# Patient Record
Sex: MALE | Race: WHITE | NOT HISPANIC OR LATINO | Employment: PART TIME | URBAN - METROPOLITAN AREA
[De-identification: names, ages, dates, MRNs, and addresses within clinical notes are randomized per-mention and may not be internally consistent; named-entity substitution may affect disease eponyms.]

---

## 2017-11-02 ENCOUNTER — OFFICE VISIT (OUTPATIENT)
Dept: FAMILY MEDICINE | Facility: OTHER | Age: 22
End: 2017-11-02
Attending: NURSE PRACTITIONER
Payer: MEDICAID

## 2017-11-02 VITALS
DIASTOLIC BLOOD PRESSURE: 80 MMHG | SYSTOLIC BLOOD PRESSURE: 100 MMHG | WEIGHT: 285 LBS | OXYGEN SATURATION: 98 % | TEMPERATURE: 98.4 F | HEART RATE: 100 BPM | RESPIRATION RATE: 14 BRPM

## 2017-11-02 DIAGNOSIS — J01.90 ACUTE SINUSITIS WITH SYMPTOMS GREATER THAN 10 DAYS: Primary | ICD-10-CM

## 2017-11-02 PROCEDURE — 99213 OFFICE O/P EST LOW 20 MIN: CPT | Performed by: NURSE PRACTITIONER

## 2017-11-02 PROCEDURE — 99212 OFFICE O/P EST SF 10 MIN: CPT

## 2017-11-02 RX ORDER — CEFDINIR 300 MG/1
600 CAPSULE ORAL DAILY
Qty: 20 CAPSULE | Refills: 0 | Status: SHIPPED | OUTPATIENT
Start: 2017-11-02 | End: 2019-09-26

## 2017-11-02 RX ORDER — FLUTICASONE PROPIONATE 50 MCG
1-2 SPRAY, SUSPENSION (ML) NASAL DAILY
Qty: 16 G | Refills: 0 | Status: SHIPPED | OUTPATIENT
Start: 2017-11-02 | End: 2021-09-30

## 2017-11-02 ASSESSMENT — ANXIETY QUESTIONNAIRES
2. NOT BEING ABLE TO STOP OR CONTROL WORRYING: NOT AT ALL
3. WORRYING TOO MUCH ABOUT DIFFERENT THINGS: SEVERAL DAYS
GAD7 TOTAL SCORE: 2
6. BECOMING EASILY ANNOYED OR IRRITABLE: NOT AT ALL
4. TROUBLE RELAXING: NOT AT ALL
IF YOU CHECKED OFF ANY PROBLEMS ON THIS QUESTIONNAIRE, HOW DIFFICULT HAVE THESE PROBLEMS MADE IT FOR YOU TO DO YOUR WORK, TAKE CARE OF THINGS AT HOME, OR GET ALONG WITH OTHER PEOPLE: NOT DIFFICULT AT ALL
7. FEELING AFRAID AS IF SOMETHING AWFUL MIGHT HAPPEN: NOT AT ALL
5. BEING SO RESTLESS THAT IT IS HARD TO SIT STILL: NOT AT ALL
1. FEELING NERVOUS, ANXIOUS, OR ON EDGE: SEVERAL DAYS

## 2017-11-02 ASSESSMENT — PATIENT HEALTH QUESTIONNAIRE - PHQ9: SUM OF ALL RESPONSES TO PHQ QUESTIONS 1-9: 5

## 2017-11-02 NOTE — MR AVS SNAPSHOT
After Visit Summary   11/2/2017    Waldemar Nicole    MRN: [id number]           Patient Information     Date Of Birth          1995        Visit Information        Provider Department      11/2/2017 3:30 PM Angelia Yost NP [facility]        Today's Diagnoses     Acute sinusitis with symptoms greater than 10 days    -  1      Care Instructions      ASSESSMENT/PLAN:       1. Acute sinusitis with symptoms greater than 10 days  symptomatic  - cefdinir (OMNICEF) 300 MG capsule; Take 2 capsules (600 mg) by mouth daily  Dispense: 20 capsule; Refill: 0  - fluticasone (FLONASE) 50 MCG/ACT spray; Spray 1-2 sprays into both nostrils daily  Dispense: 16 g; Refill: 0  - increase fluids  - rest    FUTURE APPOINTMENTS:       - Follow-up visit if no improvement or any worsening    Angelia Yost NP  [facility]  Sinusitis (Antibiotic Treatment)    The sinuses are air-filled spaces within the bones of the face. They connect to the inside of the nose. Sinusitis is an inflammation of the tissue lining the sinus cavity. Sinus inflammation can occur during a cold. It can also be due to allergies to pollens and other particles in the air. Sinusitis can cause symptoms of sinus congestion and fullness. A sinus infection causes fever, headache and facial pain. There is often green or yellow drainage from the nose or into the back of the throat (post-nasal drip). You have been given antibiotics to treat this condition.  Home care:    Take the full course of antibiotics as instructed. Do not stop taking them, even if you feel better.    Drink plenty of water, hot tea, and other liquids. This may help thin mucus. It also may promote sinus drainage.    Heat may help soothe painful areas of the face. Use a towel soaked in hot water. Or,  the shower and direct the hot spray onto your face. Using a vaporizer along with a menthol rub at night may also  help.     An expectorant containing guaifenesin may help thin the mucus and promote drainage from the sinuses.    Over-the-counter decongestants may be used unless a similar medicine was prescribed. Nasal sprays work the fastest. Use one that contains phenylephrine or oxymetazoline. First blow the nose gently. Then use the spray. Do not use these medicines more often than directed on the label or symptoms may get worse. You may also use tablets containing pseudoephedrine. Avoid products that combine ingredients, because side effects may be increased. Read labels. You can also ask the pharmacist for help. (NOTE: Persons with high blood pressure should not use decongestants. They can raise blood pressure.)    Over-the-counter antihistamines may help if allergies contributed to your sinusitis.      Do not use nasal rinses or irrigation during an acute sinus infection, unless told to by your health care provider. Rinsing may spread the infection to other sinuses.    Use acetaminophen or ibuprofen to control pain, unless another pain medicine was prescribed. (If you have chronic liver or kidney disease or ever had a stomach ulcer, talk with your doctor before using these medicines. Aspirin should never be used in anyone under 18 years of age who is ill with a fever. It may cause severe liver damage.)    Don't smoke. This can worsen symptoms.  Follow-up care  Follow up with your healthcare provider or our staff if you are not improving within the next week.  When to seek medical advice  Call your healthcare provider if any of these occur:    Facial pain or headache becoming more severe    Stiff neck    Unusual drowsiness or confusion    Swelling of the forehead or eyelids    Vision problems, including blurred or double vision    Fever of 100.4 F (38 C) or higher, or as directed by your healthcare provider    Seizure    Breathing problems    Symptoms not resolving within 10 days  Date Last Reviewed: 4/13/2015 2000-2017  "[employer]. [address] [place] [address], [place] [address]. All rights reserved. This information is not intended as a substitute for professional medical care. Always follow your healthcare professional's instructions.                Follow-ups after your visit        Who to contact     If you have questions or need follow up information about today's clinic visit or your schedule please contact [facility] directly at [phone].  Normal or non-critical lab and imaging results will be communicated to you by [facility], letter or phone within 4 business days after the clinic has received the results. If you do not hear from us within 7 days, please contact the clinic through [facility] or phone. If you have a critical or abnormal lab result, we will notify you by phone as soon as possible.  Submit refill requests through [employer] or call your pharmacy and they will forward the refill request to us. Please allow 3 business days for your refill to be completed.          Additional Information About Your Visit        [facility] Information     [employer] lets you send messages to your doctor, view your test results, renew your prescriptions, schedule appointments and more. To sign up, go to [email or web address] . Click on \"Log in\" on the left side of the screen, which will take you to the Welcome page. Then click on \"Sign up Now\" on the right side of the page.     You will be asked to enter the access code listed below, as well as some personal information. Please follow the directions to create your username and password.     Your access code is: [email or web address]  Expires: 2018  3:51 PM     Your access code will  in 90 days. If you need help or a new code, please call your [facility] or [phone].        Care EveryWhere ID     This is your Care EveryWhere ID. This could be used by other organizations to access your [place] medical records  [id number]        Your Vitals Were     Pulse " Temperature Respirations Pulse Oximetry          100 98.4  F (36.9  C) 14 98%         Blood Pressure from Last 3 Encounters:   11/02/17 100/80   09/29/16 118/86    Weight from Last 3 Encounters:   11/02/17 285 lb (129.3 kg)   09/29/16 285 lb (129.3 kg) (>99 %)*     * Growth percentiles are based on [facility] 2-20 Years data.              Today, you had the following     No orders found for display         Today's Medication Changes          These changes are accurate as of: 11/2/17  3:51 PM.  If you have any questions, ask your nurse or doctor.               Start taking these medicines.        Dose/Directions    cefdinir 300 MG capsule   Commonly known as:  OMNICEF   Used for:  Acute sinusitis with symptoms greater than 10 days   Started by:  Angelia Yost NP        Dose:  600 mg   Take 2 capsules (600 mg) by mouth daily   Quantity:  20 capsule   Refills:  0       fluticasone 50 MCG/ACT spray   Commonly known as:  FLONASE   Used for:  Acute sinusitis with symptoms greater than 10 days   Started by:  Angelia Yost NP        Dose:  1-2 spray   Spray 1-2 sprays into both nostrils daily   Quantity:  16 g   Refills:  0         Stop taking these medicines if you haven't already. Please contact your care team if you have questions.     azithromycin 250 MG tablet   Commonly known as:  ZITHROMAX   Stopped by:  Angelia Yost NP                Where to get your medicines      These medications were sent to [place] [address] [place] [address] [address]     Phone:  [phone]     cefdinir 300 MG capsule    fluticasone 50 MCG/ACT spray                Primary Care Provider Office Phone # Fax #    Shannon Franz [phone] [address] [facility] [address]        Equal Access to Services     DARA MUELLER AH: Pati López, ab colvin, stefany enriquez  ariel leggett ah. So [facility] [phone].    ATENCIÓN: Si habla milena, tiene a foley disposición servicios gratuitos de asistencia lingüística. Katie al [phone].    We comply with applicable federal civil rights laws and [place] laws. We do not discriminate on the basis of race, color, national origin, age, disability, sex, sexual orientation, or gender identity.            Thank you!     Thank you for choosing [facility]  for your care. Our goal is always to provide you with excellent care. Hearing back from our patients is one way we can continue to improve our services. Please take a few minutes to complete the written survey that you may receive in the mail after your visit with us. Thank you!             Your Updated Medication List - Protect others around you: Learn how to safely use, store and throw away your medicines at www.disposemymeds.org.          This list is accurate as of: 11/2/17  3:51 PM.  Always use your most recent med list.                   Brand Name Dispense Instructions for use Diagnosis    cefdinir 300 MG capsule    OMNICEF    20 capsule    Take 2 capsules (600 mg) by mouth daily    Acute sinusitis with symptoms greater than 10 days       fluticasone 50 MCG/ACT spray    FLONASE    16 g    Spray 1-2 sprays into both nostrils daily    Acute sinusitis with symptoms greater than 10 days

## 2017-11-02 NOTE — NURSING NOTE
Chief Complaint   Patient presents with     URI       Initial /80 (BP Location: Left arm, Patient Position: Sitting, Cuff Size: Adult Large)  Pulse 100  Temp 98.4  F (36.9  C)  Resp 14  Wt 285 lb (129.3 kg)  SpO2 98% There is no height or weight on file to calculate BMI.  Medication Reconciliation: complete       Gabrielle Eastman

## 2017-11-02 NOTE — PATIENT INSTRUCTIONS
ASSESSMENT/PLAN:       1. Acute sinusitis with symptoms greater than 10 days  symptomatic  - cefdinir (OMNICEF) 300 MG capsule; Take 2 capsules (600 mg) by mouth daily  Dispense: 20 capsule; Refill: 0  - fluticasone (FLONASE) 50 MCG/ACT spray; Spray 1-2 sprays into both nostrils daily  Dispense: 16 g; Refill: 0  - increase fluids  - rest    FUTURE APPOINTMENTS:       - Follow-up visit if no improvement or any worsening    Angelia Yost NP  [facility]  Sinusitis (Antibiotic Treatment)    The sinuses are air-filled spaces within the bones of the face. They connect to the inside of the nose. Sinusitis is an inflammation of the tissue lining the sinus cavity. Sinus inflammation can occur during a cold. It can also be due to allergies to pollens and other particles in the air. Sinusitis can cause symptoms of sinus congestion and fullness. A sinus infection causes fever, headache and facial pain. There is often green or yellow drainage from the nose or into the back of the throat (post-nasal drip). You have been given antibiotics to treat this condition.  Home care:    Take the full course of antibiotics as instructed. Do not stop taking them, even if you feel better.    Drink plenty of water, hot tea, and other liquids. This may help thin mucus. It also may promote sinus drainage.    Heat may help soothe painful areas of the face. Use a towel soaked in hot water. Or,  the shower and direct the hot spray onto your face. Using a vaporizer along with a menthol rub at night may also help.     An expectorant containing guaifenesin may help thin the mucus and promote drainage from the sinuses.    Over-the-counter decongestants may be used unless a similar medicine was prescribed. Nasal sprays work the fastest. Use one that contains phenylephrine or oxymetazoline. First blow the nose gently. Then use the spray. Do not use these medicines more often than directed on the label or symptoms may  get worse. You may also use tablets containing pseudoephedrine. Avoid products that combine ingredients, because side effects may be increased. Read labels. You can also ask the pharmacist for help. (NOTE: Persons with high blood pressure should not use decongestants. They can raise blood pressure.)    Over-the-counter antihistamines may help if allergies contributed to your sinusitis.      Do not use nasal rinses or irrigation during an acute sinus infection, unless told to by your health care provider. Rinsing may spread the infection to other sinuses.    Use acetaminophen or ibuprofen to control pain, unless another pain medicine was prescribed. (If you have chronic liver or kidney disease or ever had a stomach ulcer, talk with your doctor before using these medicines. Aspirin should never be used in anyone under 18 years of age who is ill with a fever. It may cause severe liver damage.)    Don't smoke. This can worsen symptoms.  Follow-up care  Follow up with your healthcare provider or our staff if you are not improving within the next week.  When to seek medical advice  Call your healthcare provider if any of these occur:    Facial pain or headache becoming more severe    Stiff neck    Unusual drowsiness or confusion    Swelling of the forehead or eyelids    Vision problems, including blurred or double vision    Fever of 100.4 F (38 C) or higher, or as directed by your healthcare provider    Seizure    Breathing problems    Symptoms not resolving within 10 days  Date Last Reviewed: 4/13/2015 2000-2017 [employer]. [address] [place] [address]. All rights reserved. This information is not intended as a substitute for professional medical care. Always follow your healthcare professional's instructions.

## 2017-11-02 NOTE — PROGRESS NOTES
SUBJECTIVE:   Waldemar Nicole is a 22 year old male who presents to clinic today for the following health issues:      Acute Illness   Acute illness concerns: cough  Onset: 2 weeks    Fever: no     Chills/Sweats: no     Headache (location?): no     Sinus Pressure:no    Conjunctivitis:  no    Ear Pain: no    Rhinorrhea: YES    Congestion: YES, chest    Sore Throat:YES     Cough: YES - productive    Wheeze: no     Decreased Appetite: YES    Nausea: no     Vomiting: no     Diarrhea:  no     Dysuria/Freq.: no     Fatigue/Achiness: no     Sick/Strep Exposure: no      Therapies Tried and outcome: dayquil, nyquil, cough drops, slight help            Problem list and histories reviewed & adjusted, as indicated.  Additional history: as documented    There is no problem list on file for this patient.    Past Surgical History:   Procedure Laterality Date     CIRCUMCISION         Social History   Substance Use Topics     Smoking status: Never Smoker     Smokeless tobacco: Never Used      Comment: no passive exposure     Alcohol use Not on file     Family History   Problem Relation Age of Onset     Allergies Father          No current outpatient prescriptions on file.     Allergies   Allergen Reactions     Amoxicillin      No lab results found.   BP Readings from Last 3 Encounters:   11/02/17 100/80   09/29/16 118/86    Wt Readings from Last 3 Encounters:   11/02/17 285 lb (129.3 kg)   09/29/16 285 lb (129.3 kg) (>99 %)*     * Growth percentiles are based on CDC 2-20 Years data.            Reviewed and updated as needed this visit by clinical staff     Reviewed and updated as needed this visit by Provider         ROS:  Constitutional, HEENT, cardiovascular, pulmonary, gi and gu systems are negative, except as otherwise noted.      OBJECTIVE:   /80 (BP Location: Left arm, Patient Position: Sitting, Cuff Size: Adult Large)  Pulse 100  Temp 98.4  F (36.9  C)  Resp 14  Wt 285 lb (129.3 kg)  SpO2 98%  There is no height or  weight on file to calculate BMI.  GENERAL: healthy, alert and no distress  HENT: normal cephalic/atraumatic, right ear: dull, left ear: erythematous and bulging membrane, nose and mouth without ulcers or lesions, nasal mucosa edematous , rhinorrhea thick and purulent, oral mucous membranes moist and sinuses: maxillary tenderness on bilateral, maxillary swelling on bilateral, throat erythematous with post nasal drip noted.   NECK: cervical adenopathy anterior, no asymmetry, masses, or scars and thyroid normal to palpation  RESP: lungs clear to auscultation - no rales, rhonchi or wheezes  CV: regular rate and rhythm, normal S1 S2, no S3 or S4, no murmur, click or rub, no peripheral edema and peripheral pulses strong  MS: no gross musculoskeletal defects noted, no edema  PSYCH: mentation appears normal, affect normal/bright      ASSESSMENT/PLAN:       1. Acute sinusitis with symptoms greater than 10 days  symptomatic  - cefdinir (OMNICEF) 300 MG capsule; Take 2 capsules (600 mg) by mouth daily  Dispense: 20 capsule; Refill: 0  - fluticasone (FLONASE) 50 MCG/ACT spray; Spray 1-2 sprays into both nostrils daily  Dispense: 16 g; Refill: 0  - increase fluids  - rest    FUTURE APPOINTMENTS:       - Follow-up visit if no improvement or any worsening    Angelia Yost NP  [facility]

## 2017-11-03 ASSESSMENT — ANXIETY QUESTIONNAIRES: GAD7 TOTAL SCORE: 2

## 2019-09-25 ENCOUNTER — NURSE TRIAGE (OUTPATIENT)
Dept: FAMILY MEDICINE | Facility: OTHER | Age: 24
End: 2019-09-25

## 2019-09-25 NOTE — PROGRESS NOTES
Subjective     Waldemar Nicole is a 24 year old male who presents to clinic today for the following health issues:    HPI   Acute Illness   Acute illness concerns: URI  Onset: 7 days    Fever: YES    Chills/Sweats: YES    Headache (location?): YES    Sinus Pressure:YES    Conjunctivitis:  no    Ear Pain: YES: bilateral    Rhinorrhea: no     Congestion: YES    Sore Throat: no     Cough: YES-productive of yellow sputum, barking, worsening over time    Wheeze: no    Decreased Appetite: no    Nausea: YES    Vomiting: YES    Diarrhea:  no    Dysuria/Freq.: no    Fatigue/Achiness: YES    Sick/Strep Exposure: no      Therapies Tried and outcome: robitussin- not relief        There is no problem list on file for this patient.    Past Surgical History:   Procedure Laterality Date     CIRCUMCISION         Social History     Tobacco Use     Smoking status: Never Smoker     Smokeless tobacco: Never Used     Tobacco comment: no passive exposure   Substance Use Topics     Alcohol use: Not on file     Family History   Problem Relation Age of Onset     Allergies Father          Current Outpatient Medications   Medication Sig Dispense Refill     cefdinir (OMNICEF) 300 MG capsule Take 1 capsule (300 mg) by mouth 2 times daily for 10 days 20 capsule 0     fluticasone (FLONASE) 50 MCG/ACT spray Spray 1-2 sprays into both nostrils daily (Patient not taking: Reported on 9/26/2019) 16 g 0     Allergies   Allergen Reactions     Amoxicillin          Reviewed and updated as needed this visit by Provider         Review of Systems   ROS COMP: Constitutional, HEENT, cardiovascular, pulmonary, gi and gu systems are negative, except as otherwise noted.      Objective    /74 (BP Location: Right arm, Patient Position: Sitting, Cuff Size: Adult Regular)   Pulse 120   Temp 98.3  F (36.8  C) (Tympanic)   Wt 128.2 kg (282 lb 9.6 oz)   SpO2 90%   There is no height or weight on file to calculate BMI.  Physical Exam   GENERAL: alert and no  distress  EYES: Eyes grossly normal to inspection, PERRL and conjunctivae and sclerae normal  HENT: normal cephalic/atraumatic, both ears: clear effusion, nasal mucosa edematous , rhinorrhea clear, oropharynx clear and oral mucous membranes moist  NECK: no adenopathy  RESP: lungs clear to auscultation - no rales, rhonchi or wheezes  CV: regular rates and rhythm, normal S1 S2, no S3 or S4 and no murmur, click or rub  PSYCH: mentation appears normal, affect normal/bright    Diagnostic Test Results:  none         Assessment & Plan     1. Acute sinusitis with symptoms greater than 10 days  Antibiotics prescribed.  Symptomatic cares recommended.  Follow-up if no improvement noted.  - cefdinir (OMNICEF) 300 MG capsule; Take 1 capsule (300 mg) by mouth 2 times daily for 10 days  Dispense: 20 capsule; Refill: 0         Return if symptoms worsen or fail to improve.    Shannon Franz MD  [facility]

## 2019-09-25 NOTE — TELEPHONE ENCOUNTER
Patient unable to come in for appointment until  Tomorrow due to ride.  Usually see's Flaim but she is off tomorrow.  Scheduled with [facility] at 10.  Advised to go to ER/UC if symptoms worsen until the  Additional Information    Negative: Bluish (or gray) lips or face    Negative: Severe difficulty breathing (e.g., struggling for each breath, speaks in single words)    Negative: Rapid onset of cough and has hives    Negative: Coughing started suddenly after medicine, an allergic food or bee sting    Negative: Difficulty breathing after exposure to flames, smoke, or fumes    Negative: Sounds like a life-threatening emergency to the triager    Negative: Previous asthma attacks and this feels like asthma attack    Negative: Chest pain present when not coughing    Negative: Difficulty breathing    Negative: Passed out (i.e., fainted, collapsed and was not responding)    Negative: Patient sounds very sick or weak to the triager    Negative: Coughed up > 1 tablespoon (15 ml) blood (Exception: blood-tinged sputum)    Negative: Fever > 103 F (39.4 C)    Negative: Fever > 101 F (38.3 C) and over 60 years of age    Negative: Fever > 100.0 F (37.8 C) and has diabetes mellitus or a weak immune system (e.g., HIV positive, cancer chemotherapy, organ transplant, splenectomy, chronic steroids)    Negative: Fever > 100.0 F (37.8 C) and bedridden (e.g., nursing home patient, stroke, chronic illness, recovering from surgery)    Negative: Increasing ankle swelling    Negative: Wheezing is present    Continuous (nonstop) coughing interferes with work or school and no improvement using cough treatment per Care Advice    Negative: SEVERE coughing spells (e.g., whooping sound after coughing, vomiting after coughing)    Negative: Coughing up leonie-colored (reddish-brown) or blood-tinged sputum    Negative: Fever present > 3 days (72 hours)    Negative: Fever returns after gone for over 24 hours and symptoms worse or not improved     "Negative: Using nasal washes and pain medicine > 24 hours and sinus pain persists    Negative: Known COPD or other severe lung disease (i.e., bronchiectasis, cystic fibrosis, lung surgery) and worsening symptoms (i.e., increased sputum purulence or amount, increased breathing difficulty)    Answer Assessment - Initial Assessment Questions  1. ONSET: \"When did the cough begin?\"       One week ago  2. SEVERITY: \"How bad is the cough today?\"       Barking cough, vomiting  3. RESPIRATORY DISTRESS: \"Describe your breathing.\"       Nasal congestion  4. FEVER: \"Do you have a fever?\" If so, ask: \"What is your temperature, how was it measured, and when did it start?\"      99.1  5. HEMOPTYSIS: \"Are you coughing up any blood?\" If so ask: \"How much?\" (flecks, streaks, tablespoons, etc.)      mucas from nose is yellow   6. TREATMENT: \"What have you done so far to treat the cough?\" (e.g., meds, fluids, humidifier)      robitussin  7. CARDIAC HISTORY: \"Do you have any history of heart disease?\" (e.g., heart attack, congestive heart failure)         8. LUNG HISTORY: \"Do you have any history of lung disease?\"  (e.g., pulmonary embolus, asthma, emphysema)        9. PE RISK FACTORS: \"Do you have a history of blood clots?\" (or: recent major surgery, recent prolonged travel, bedridden )        10. OTHER SYMPTOMS: \"Do you have any other symptoms? (e.g., runny nose, wheezing, chest pain)        Runny nose, coughing up phlegm  11. PREGNANCY: \"Is there any chance you are pregnant?\" \"When was your last menstrual period?\"          12. TRAVEL: \"Have you traveled out of the country in the last month?\" (e.g., travel history, exposures)    Protocols used: COUGH-A-OH      "

## 2019-09-26 ENCOUNTER — OFFICE VISIT (OUTPATIENT)
Dept: FAMILY MEDICINE | Facility: OTHER | Age: 24
End: 2019-09-26
Attending: FAMILY MEDICINE
Payer: MEDICAID

## 2019-09-26 VITALS
SYSTOLIC BLOOD PRESSURE: 124 MMHG | WEIGHT: 282.6 LBS | TEMPERATURE: 98.3 F | HEART RATE: 120 BPM | DIASTOLIC BLOOD PRESSURE: 74 MMHG | OXYGEN SATURATION: 90 %

## 2019-09-26 DIAGNOSIS — J01.90 ACUTE SINUSITIS WITH SYMPTOMS GREATER THAN 10 DAYS: Primary | ICD-10-CM

## 2019-09-26 PROCEDURE — 99213 OFFICE O/P EST LOW 20 MIN: CPT | Performed by: FAMILY MEDICINE

## 2019-09-26 PROCEDURE — G0463 HOSPITAL OUTPT CLINIC VISIT: HCPCS

## 2019-09-26 RX ORDER — CEFDINIR 300 MG/1
300 CAPSULE ORAL 2 TIMES DAILY
Qty: 20 CAPSULE | Refills: 0 | Status: SHIPPED | OUTPATIENT
Start: 2019-09-26 | End: 2019-10-06

## 2019-09-26 ASSESSMENT — PAIN SCALES - GENERAL: PAINLEVEL: NO PAIN (0)

## 2019-09-26 NOTE — NURSING NOTE
Chief Complaint   Patient presents with     URI       Initial /74 (BP Location: Right arm, Patient Position: Sitting, Cuff Size: Adult Regular)   Pulse 120   Temp 98.3  F (36.8  C) (Tympanic)   Wt 128.2 kg (282 lb 9.6 oz)   SpO2 90%  There is no height or weight on file to calculate BMI.  Medication Reconciliation: complete  Henna Friedman MA

## 2019-09-26 NOTE — LETTER
[facility]  [address]  [place] [address]  Phone: [phone]    September 26, 2019        Waldemar Nicole  [facility] [address] [place] [address]          To whom it may concern:    RE: Waldemar Nicole    Patient was seen and treated today at our clinic.  He may return to work once he is fever-free for 24 hours.    Please contact me for questions or concerns.      Sincerely,        Shannon Franz MD

## 2020-10-12 ENCOUNTER — TRANSFERRED RECORDS (OUTPATIENT)
Dept: HEALTH INFORMATION MANAGEMENT | Facility: CLINIC | Age: 25
End: 2020-10-12

## 2021-06-23 ENCOUNTER — APPOINTMENT (OUTPATIENT)
Dept: OCCUPATIONAL MEDICINE | Facility: OTHER | Age: 26
End: 2021-06-23

## 2021-06-23 PROCEDURE — 99000 SPECIMEN HANDLING OFFICE-LAB: CPT

## 2021-09-29 ENCOUNTER — NURSE TRIAGE (OUTPATIENT)
Dept: FAMILY MEDICINE | Facility: OTHER | Age: 26
End: 2021-09-29

## 2021-09-29 NOTE — TELEPHONE ENCOUNTER
"Patient poyl with ear congestion. Patient states he has been dealing with symptoms for 2 weeks. Patient scheduled to see provider tomorrow.     Reason for Disposition    Ear congestion present > 48 hours    Additional Information    Negative: Ear pain is the main symptom    Negative: Hearing loss (complete or partial) is the main symptom    Negative: Earwax is the main concern    Negative: Has nasal allergies and they are acting up    Negative: Earache lasts > 1 hour    Negative: Pus or cloudy discharge from ear canal    Negative: Patient wants to be seen    Answer Assessment - Initial Assessment Questions  1. LOCATION: \"Which ear is involved?\"        Left ear  2. SENSATION: \"Describe how the ear feels.\"       Feels plugged  3. ONSET:  \"When did the ear symptoms start?\"        Two weeks ago  4. PAIN: \"Do you also have an earache?\" If so, ask: \"How bad is it?\" (Scale 1-10; or mild, moderate, severe)      no  5. CAUSE: \"What do you think is causing the ear congestion?\"      Possible allergies  6. URI: \"Do you have a runny nose or cough?\"       no  7. NASAL ALLERGIES: \"Are there symptoms of hay fever, such as sneezing or a clear nasal discharge?\"      no  8. PREGNANCY: \"Is there any chance you are pregnant?\" \"When was your last menstrual period?\"      n/a    Protocols used: EAR - CONGESTION-A-OH      "

## 2021-09-30 ENCOUNTER — OFFICE VISIT (OUTPATIENT)
Dept: FAMILY MEDICINE | Facility: OTHER | Age: 26
End: 2021-09-30
Attending: NURSE PRACTITIONER
Payer: MEDICAID

## 2021-09-30 VITALS
SYSTOLIC BLOOD PRESSURE: 124 MMHG | HEIGHT: 72 IN | HEART RATE: 104 BPM | OXYGEN SATURATION: 97 % | BODY MASS INDEX: 42.66 KG/M2 | DIASTOLIC BLOOD PRESSURE: 82 MMHG | WEIGHT: 315 LBS | TEMPERATURE: 98.9 F

## 2021-09-30 DIAGNOSIS — H69.92 DYSFUNCTION OF LEFT EUSTACHIAN TUBE: Primary | ICD-10-CM

## 2021-09-30 DIAGNOSIS — J31.0 CHRONIC RHINITIS: ICD-10-CM

## 2021-09-30 DIAGNOSIS — M54.50 BILATERAL LOW BACK PAIN WITHOUT SCIATICA, UNSPECIFIED CHRONICITY: ICD-10-CM

## 2021-09-30 PROCEDURE — 99213 OFFICE O/P EST LOW 20 MIN: CPT | Performed by: NURSE PRACTITIONER

## 2021-09-30 PROCEDURE — G0463 HOSPITAL OUTPT CLINIC VISIT: HCPCS

## 2021-09-30 RX ORDER — FLUTICASONE PROPIONATE 50 MCG
1-2 SPRAY, SUSPENSION (ML) NASAL DAILY
Qty: 16 G | Refills: 0 | Status: SHIPPED | OUTPATIENT
Start: 2021-09-30

## 2021-09-30 RX ORDER — CIPROFLOXACIN AND DEXAMETHASONE 3; 1 MG/ML; MG/ML
4 SUSPENSION/ DROPS AURICULAR (OTIC) 2 TIMES DAILY
Qty: 2.8 ML | Refills: 0 | Status: SHIPPED | OUTPATIENT
Start: 2021-09-30 | End: 2021-10-07

## 2021-09-30 ASSESSMENT — MIFFLIN-ST. JEOR: SCORE: 2465.9

## 2021-09-30 ASSESSMENT — PAIN SCALES - GENERAL: PAINLEVEL: NO PAIN (0)

## 2021-09-30 NOTE — NURSING NOTE
Chief Complaint   Patient presents with     Ear Fullness       Initial /82 (BP Location: Right arm, Patient Position: Chair, Cuff Size: Adult Large)   Pulse 104   Temp 98.9  F (37.2  C) (Tympanic)   Ht 1.829 m (6')   Wt 143.8 kg (317 lb)   SpO2 97%   BMI 42.99 kg/m   Estimated body mass index is 42.99 kg/m  as calculated from the following:    Height as of this encounter: 1.829 m (6').    Weight as of this encounter: 143.8 kg (317 lb).  Medication Reconciliation: complete  Celsa Eugene LPN

## 2021-09-30 NOTE — PROGRESS NOTES
Assessment & Plan     1. Dysfunction of left eustachian tube  Continue flonase and zyrtec  - ciprofloxacin-dexamethasone (CIPRODEX) 0.3-0.1 % otic suspension; Place 4 drops Into the left ear 2 times daily for 7 days  Dispense: 2.8 mL; Refill: 0    2. Chronic rhinitis  - fluticasone (FLONASE) 50 MCG/ACT nasal spray; Spray 1-2 sprays into both nostrils daily  Dispense: 16 g; Refill: 0    3. Bilateral low back pain without sciatica, unspecified chronicity  Work note provided         BMI:   Estimated body mass index is 42.99 kg/m  as calculated from the following:    Height as of this encounter: 1.829 m (6').    Weight as of this encounter: 143.8 kg (317 lb).   Weight management plan: Discussed healthy diet and exercise guidelines    Follow-up as needed or if no improvement or any worsening.     Angelia Yost NP  [facility] - [place] LUIS Medeiros is a 26 year old who presents for the following health issues     HPI     Acute Illness  Acute illness concerns: ear infection   Onset/Duration: about 2 weeks.   Symptoms:  Fever: no  Chills/Sweats: no  Headache (location?): no  Sinus Pressure: no  Conjunctivitis:  no  Ear Pain: YES: left- plugged no pain   Rhinorrhea: no  Congestion: no  Sore Throat: no  Cough: no  Wheeze: no  Decreased Appetite: no  Nausea: no  Vomiting: no  Diarrhea: no  Dysuria/Freq.: no  Dysuria or Hematuria: no  Fatigue/Achiness: no  Sick/Strep Exposure: no  Therapies tried and outcome: zyrtec and flonase.     Back Pain- needs work note   Onset/Duration: 9/21/21  Description:   Location of pain: middle of back left  Character of pain: dull ache  Pain radiation: none  New numbness or weakness in legs, not attributed to pain: no   Intensity: At its worst 0/10  Progression of Symptoms: improving  History:   Specific cause: none  Pain interferes with job:  no   History of back problems: no prior back problems  Any previous MRI or X-rays: None  Sees a specialist for back  pain: No  Alleviating factors:   Improved by: rest    Precipitating factors:  Worsened by: Nothing  Therapies tried and outcome: rest    He just needs a work note to return to work next week.  He feels he can do his job safely.      There is no problem list on file for this patient.    Past Surgical History:   Procedure Laterality Date     CIRCUMCISION         Social History     Tobacco Use     Smoking status: Never Smoker     Smokeless tobacco: Never Used     Tobacco comment: no passive exposure   Substance Use Topics     Alcohol use: Not on file     Family History   Problem Relation Age of Onset     Allergies Father          Current Outpatient Medications   Medication Sig Dispense Refill     fluticasone (FLONASE) 50 MCG/ACT spray Spray 1-2 sprays into both nostrils daily (Patient not taking: Reported on 9/26/2019) 16 g 0     Allergies   Allergen Reactions     Amoxicillin      No lab results found.   BP Readings from Last 3 Encounters:   09/30/21 124/82   09/26/19 124/74   11/02/17 100/80    Wt Readings from Last 3 Encounters:   09/30/21 143.8 kg (317 lb)   09/26/19 128.2 kg (282 lb 9.6 oz)   11/02/17 129.3 kg (285 lb)                      Review of Systems   Constitutional, HEENT, cardiovascular, pulmonary, gi and gu systems are negative, except as otherwise noted.      Objective    /82 (BP Location: Right arm, Patient Position: Chair, Cuff Size: Adult Large)   Pulse 104   Temp 98.9  F (37.2  C) (Tympanic)   Ht 1.829 m (6')   Wt 143.8 kg (317 lb)   SpO2 97%   BMI 42.99 kg/m    Body mass index is 42.99 kg/m .  Physical Exam   GENERAL: alert, no distress and obese  HENT: normal cephalic/atraumatic, right ear: normal: no effusions, no erythema, normal landmarks, left ear: erythematous and retracted, nose and mouth without ulcers or lesions, oropharynx clear and oral mucous membranes moist  NECK: no adenopathy, no asymmetry, masses, or scars and thyroid normal to palpation  RESP: lungs clear to  auscultation - no rales, rhonchi or wheezes  CV: regular rate and rhythm, normal S1 S2, no S3 or S4, no murmur, click or rub, no peripheral edema and peripheral pulses strong  MS: no gross musculoskeletal defects noted, no edema  PSYCH: mentation appears normal, affect normal/bright

## 2021-09-30 NOTE — LETTER
[facility]  [address]  [facility] [address]  Phone: [phone]    September 30, 2021        Waldemar Nicole  [facility] [place] [id number]          To whom it may concern:    RE: Waldemar Nicole    Patient was seen and treated today at our clinic.  Patient may return to work October 5, 2021 with the following:  No working or lifting restrictions    Please contact me for questions or concerns.      Sincerely,        Angelia Yost NP

## 2021-09-30 NOTE — PATIENT INSTRUCTIONS
Assessment & Plan     1. Dysfunction of left eustachian tube  Continue flonase and zyrtec  - ciprofloxacin-dexamethasone (CIPRODEX) 0.3-0.1 % otic suspension; Place 4 drops Into the left ear 2 times daily for 7 days  Dispense: 2.8 mL; Refill: 0    2. Chronic rhinitis  - fluticasone (FLONASE) 50 MCG/ACT nasal spray; Spray 1-2 sprays into both nostrils daily  Dispense: 16 g; Refill: 0    3. Bilateral low back pain without sciatica, unspecified chronicity  Work note provided         BMI:   Estimated body mass index is 42.99 kg/m  as calculated from the following:    Height as of this encounter: 1.829 m (6').    Weight as of this encounter: 143.8 kg (317 lb).   Weight management plan: Discussed healthy diet and exercise guidelines    Follow-up as needed or fi no improvement or any worsening.     Angelia Yost NP  [facility]

## 2021-10-03 ENCOUNTER — HEALTH MAINTENANCE LETTER (OUTPATIENT)
Age: 26
End: 2021-10-03

## 2022-01-03 ENCOUNTER — OFFICE VISIT (OUTPATIENT)
Dept: FAMILY MEDICINE | Facility: OTHER | Age: 27
End: 2022-01-03
Attending: NURSE PRACTITIONER
Payer: MEDICAID

## 2022-01-03 VITALS
BODY MASS INDEX: 42.66 KG/M2 | HEART RATE: 131 BPM | TEMPERATURE: 98.2 F | DIASTOLIC BLOOD PRESSURE: 80 MMHG | SYSTOLIC BLOOD PRESSURE: 128 MMHG | WEIGHT: 315 LBS | OXYGEN SATURATION: 98 % | HEIGHT: 72 IN

## 2022-01-03 DIAGNOSIS — Z01.10 NORMAL EAR EXAM: Primary | ICD-10-CM

## 2022-01-03 PROCEDURE — 99212 OFFICE O/P EST SF 10 MIN: CPT | Performed by: NURSE PRACTITIONER

## 2022-01-03 PROCEDURE — G0463 HOSPITAL OUTPT CLINIC VISIT: HCPCS

## 2022-01-03 ASSESSMENT — MIFFLIN-ST. JEOR: SCORE: 2465.9

## 2022-01-03 ASSESSMENT — PAIN SCALES - GENERAL: PAINLEVEL: NO PAIN (0)

## 2022-01-03 NOTE — NURSING NOTE
Chief Complaint   Patient presents with     Foreign Body in Ear       Initial /80 (BP Location: Left arm, Patient Position: Chair, Cuff Size: Adult Large)   Pulse (!) 131   Temp 98.2  F (36.8  C)   Ht 1.829 m (6')   Wt 143.8 kg (317 lb)   SpO2 98%   BMI 42.99 kg/m   Estimated body mass index is 42.99 kg/m  as calculated from the following:    Height as of this encounter: 1.829 m (6').    Weight as of this encounter: 143.8 kg (317 lb).  Medication Reconciliation: complete  Celsa Eugene LPN

## 2022-01-03 NOTE — PROGRESS NOTES
Assessment & Plan       Normal ear exam  - I see no visible abnormality  - Follow-up as needed      Martha Byrne, LAVERNE  [facility] - [place]RISTOPHER Medeiros is a 26 year old who presents for the following health issues         Concern - Ear problem  Onset: 1/1/21  Description: pt states using home otoscope noted something metallic in ear canal   Intensity: mild  Progression of Symptoms:  same  Accompanying Signs & Symptoms: none  Previous history of similar problem: none- hx ETD   Precipitating factors:        Worsened by: none  Alleviating factors:        Improved by: none  Therapies tried and outcome:  none           There is no problem list on file for this patient.    Past Surgical History:   Procedure Laterality Date     CIRCUMCISION         Social History     Tobacco Use     Smoking status: Never Smoker     Smokeless tobacco: Never Used     Tobacco comment: no passive exposure   Substance Use Topics     Alcohol use: Not on file     Family History   Problem Relation Age of Onset     Allergies Father            Current Outpatient Medications   Medication Sig Dispense Refill     fluticasone (FLONASE) 50 MCG/ACT nasal spray Spray 1-2 sprays into both nostrils daily 16 g 0       Allergies   Allergen Reactions     Amoxicillin        No lab results found.         BP Readings from Last 3 Encounters:   01/03/22 128/80   09/30/21 124/82   09/26/19 124/74    Wt Readings from Last 3 Encounters:   01/03/22 143.8 kg (317 lb)   09/30/21 143.8 kg (317 lb)   09/26/19 128.2 kg (282 lb 9.6 oz)                 Review of Systems   Constitutional, HEENT, cardiovascular, pulmonary, gi and gu systems are negative, except as otherwise noted.        Objective    /80 (BP Location: Left arm, Patient Position: Chair, Cuff Size: Adult Large)   Pulse (!) 131   Temp 98.2  F (36.8  C)   Ht 1.829 m (6')   Wt 143.8 kg (317 lb)   SpO2 98%   BMI 42.99 kg/m    Body mass index is 42.99 kg/m .       Physical Exam   GENERAL:  healthy, alert and no distress  HENT: Bilateral TMs normal in appearance.  Left ear canal -  Small flake of wax noted, no foreign body visible.   NECK: no adenopathy, no asymmetry, masses, or scars and thyroid normal to palpation  RESP: lungs clear to auscultation - no rales, rhonchi or wheezes  CV: regular rate and rhythm, normal S1 S2, no S3 or S4, no murmur, click or rub

## 2022-01-03 NOTE — PATIENT INSTRUCTIONS
Assessment & Plan       Normal ear exam  - I see no visible abnormality  - Follow-up as needed          Martha Byrne, CNP  [facility] - [place] [facility]

## 2022-01-30 NOTE — PROGRESS NOTES
Assessment & Plan        Rule out 2019 novel coronavirus infection  - Symptomatic; Unknown Influenza A/B & SARS-CoV2 (COVID-19) Virus PCR Multiplex Nose      Insure adequate fluid intake  Get plenty of rest  Monitor for temp at home, treat with OTC Tylenol or Ibuprofen per package instruction.  Humidity at home (add bacteriostatic solution to humidifier)  Please return in you do not improve  To UC or ER with persistent, worsening, or concerning symptoms      Martha Byrne CNP  [facility] - CHRISTOPHER Medeiros is a 26 year old who presents for the following health issues       Acute Illness  Acute illness concerns: cough/fever  Onset/Duration: 1 week  Symptoms:  Fever: YES  Chills/Sweats: no  Headache (location?): no  Sinus Pressure: no  Conjunctivitis:  no  Ear Pain: no  Rhinorrhea: no  Congestion: YES  Sore Throat: no  Cough: YES-non-productive  Wheeze: no  Decreased Appetite: no  Nausea: no  Vomiting: no  Diarrhea: no  Dysuria/Freq.: no  Dysuria or Hematuria: no  Fatigue/Achiness: no  Sick/Strep Exposure: no  Therapies tried and outcome: cough medicine      He had 2 negative home tests          There is no problem list on file for this patient.    Past Surgical History:   Procedure Laterality Date     CIRCUMCISION         Social History     Tobacco Use     Smoking status: Never Smoker     Smokeless tobacco: Never Used     Tobacco comment: no passive exposure   Substance Use Topics     Alcohol use: Not on file     Family History   Problem Relation Age of Onset     Allergies Father              Current Outpatient Medications   Medication Sig Dispense Refill     fluticasone (FLONASE) 50 MCG/ACT nasal spray Spray 1-2 sprays into both nostrils daily 16 g 0         Allergies   Allergen Reactions     Amoxicillin        No lab results found.         BP Readings from Last 3 Encounters:   01/31/22 104/86   01/03/22 128/80   09/30/21 124/82    Wt Readings from Last 3 Encounters:   01/31/22 140.6 kg (310 lb)    01/03/22 143.8 kg (317 lb)   09/30/21 143.8 kg (317 lb)               Review of Systems   Constitutional, HEENT, cardiovascular, pulmonary, gi and gu systems are negative, except as otherwise noted.        Objective    /86 (BP Location: Right arm, Patient Position: Sitting, Cuff Size: Adult Large)   Pulse 101   Temp 97.4  F (36.3  C) (Tympanic)   Resp 18   Wt 140.6 kg (310 lb)   SpO2 97%   BMI 42.04 kg/m    Body mass index is 42.04 kg/m .       Physical Exam   GENERAL: healthy, alert and no distress  EYES: Eyes grossly normal to inspection, PERRL and conjunctivae and sclerae normal  HENT: ear canals and TM's normal, nose and mouth without ulcers or lesions  NECK: no adenopathy, no asymmetry, masses, or scars and thyroid normal to palpation  RESP: lungs clear to auscultation - no rales, rhonchi or wheezes  CV: regular rate and rhythm, normal S1 S2, no S3 or S4, no murmur, click or rub, no peripheral edema and peripheral pulses strong  SKIN: no suspicious lesions or rashes  PSYCH: mentation appears normal, affect normal/bright

## 2022-01-31 ENCOUNTER — OFFICE VISIT (OUTPATIENT)
Dept: FAMILY MEDICINE | Facility: OTHER | Age: 27
End: 2022-01-31
Attending: NURSE PRACTITIONER
Payer: MEDICAID

## 2022-01-31 VITALS
TEMPERATURE: 97.4 F | RESPIRATION RATE: 18 BRPM | SYSTOLIC BLOOD PRESSURE: 104 MMHG | BODY MASS INDEX: 42.04 KG/M2 | DIASTOLIC BLOOD PRESSURE: 86 MMHG | OXYGEN SATURATION: 97 % | HEART RATE: 101 BPM | WEIGHT: 310 LBS

## 2022-01-31 DIAGNOSIS — Z20.822 SUSPECTED 2019 NOVEL CORONAVIRUS INFECTION: Primary | ICD-10-CM

## 2022-01-31 PROCEDURE — G0463 HOSPITAL OUTPT CLINIC VISIT: HCPCS

## 2022-01-31 PROCEDURE — 87637 SARSCOV2&INF A&B&RSV AMP PRB: CPT | Mod: ZL | Performed by: NURSE PRACTITIONER

## 2022-01-31 PROCEDURE — 99213 OFFICE O/P EST LOW 20 MIN: CPT | Performed by: NURSE PRACTITIONER

## 2022-01-31 ASSESSMENT — PAIN SCALES - GENERAL: PAINLEVEL: NO PAIN (0)

## 2022-01-31 NOTE — PATIENT INSTRUCTIONS
Assessment & Plan        Rule out 2019 novel coronavirus infection  - Symptomatic; Unknown Influenza A/B & SARS-CoV2 (COVID-19) Virus PCR Multiplex Nose      Insure adequate fluid intake  Get plenty of rest  Monitor for temp at home, treat with OTC Tylenol or Ibuprofen per package instruction.  Humidity at home (add bacteriostatic solution to humidifier)  Please return in you do not improve  To UC or ER with persistent, worsening, or concerning symptoms        Martha Byrne, CNP  [facility]

## 2022-01-31 NOTE — NURSING NOTE
No chief complaint on file.      Initial /86 (BP Location: Right arm, Patient Position: Sitting, Cuff Size: Adult Large)   Pulse 101   Temp 97.4  F (36.3  C) (Tympanic)   Resp 18   Wt 140.6 kg (310 lb)   SpO2 97%   BMI 42.04 kg/m   Estimated body mass index is 42.04 kg/m  as calculated from the following:    Height as of 1/3/22: 1.829 m (6').    Weight as of this encounter: 140.6 kg (310 lb).  Medication Reconciliation: complete  Marcie Dubois LPN

## 2022-02-01 LAB
FLUAV RNA SPEC QL NAA+PROBE: POSITIVE
FLUBV RNA RESP QL NAA+PROBE: NEGATIVE
RSV RNA SPEC NAA+PROBE: NEGATIVE
SARS-COV-2 RNA RESP QL NAA+PROBE: NEGATIVE

## 2022-02-01 NOTE — RESULT ENCOUNTER NOTE
Influenza A    Insure adequate fluid intake  Get plenty of rest  Monitor for temp at home, treat with OTC Tylenol or Ibuprofen per package instruction.  Humidity at home (add bacteriostatic solution to humidifier)  Please return in you do not improve  To UC or ER with persistent, worsening, or concerning symptoms

## 2022-03-13 ENCOUNTER — MYC MEDICAL ADVICE (OUTPATIENT)
Dept: FAMILY MEDICINE | Facility: OTHER | Age: 27
End: 2022-03-13
Payer: MEDICAID

## 2022-03-14 NOTE — TELEPHONE ENCOUNTER
Where di he receive this diagnosis and where was he previously treated?  If it was here, can we obtain his paper chart?

## 2022-03-18 NOTE — TELEPHONE ENCOUNTER
In review of paper chart, it looks like the diagnosis of mild autism was given by a school psychologist around the age of 5-6.  We don't have any formal records, just the notation of the diagnosis.  He was also subsequently evaluated through [facility], and we don't have those records.    The only medication record I have is that he was seen by Pediatrics for possible ADD/ADHD and was trialed on Adderall.  He did not do well on this medication (more agitated) and it was discontinued.

## 2022-09-04 ENCOUNTER — HEALTH MAINTENANCE LETTER (OUTPATIENT)
Age: 27
End: 2022-09-04

## 2023-01-15 ENCOUNTER — HEALTH MAINTENANCE LETTER (OUTPATIENT)
Age: 28
End: 2023-01-15

## 2023-12-22 ENCOUNTER — TELEPHONE (OUTPATIENT)
Dept: FAMILY MEDICINE | Facility: OTHER | Age: 28
End: 2023-12-22

## 2023-12-22 NOTE — TELEPHONE ENCOUNTER
3:55 PM    Reason for Call: OVERBOOK    Patient is having the following symptoms: PATIENT NEEDS  A NOTE FROM A PROVIDER TO GET BACK INTO WORK - OUT FOR 3 DAYS    The patient is requesting an appointment for 12-27-23 with ANY PROVIDER.    Was an appointment offered for this call? No  If yes : Appointment type              Date    Preferred method for responding to this message: Telephone Call  What is your phone number ? [phone]     If we cannot reach you directly, may we leave a detailed response at the number you provided? Yes    Can this message wait until your PCP/provider returns, if unavailable today? No,     Patricia Clay

## 2023-12-26 NOTE — PROGRESS NOTES
Assessment & Plan       Viral gastroenteritis  - Follow-up as needed  - RTW note given        Martha Byrne, CNP  [facility] - CHRISTOPHER Medeiros is a 28 year old, presenting for the following health issues:  Consult (Ill last week, needs work excuse to return.)        Concern - Flu like illness last week  Onset: 12/19/2023  Description: Had N/V x4 on Tuesday. Missed a couple days of work.   Intensity: moderate  Progression of Symptoms:  improving  Accompanying Signs & Symptoms: None  Previous history of similar problem: Yes  Precipitating factors:        Worsened by: none  Alleviating factors:        Improved by: none  Therapies tried and outcome: None          Doing well  No nausea, vomiting or diarrhea  No fevers  Eating and drinking well          There is no problem list on file for this patient.    Past Surgical History:   Procedure Laterality Date    CIRCUMCISION         Social History     Tobacco Use    Smoking status: Never    Smokeless tobacco: Never    Tobacco comments:     no passive exposure   Substance Use Topics    Alcohol use: Not on file     Family History   Problem Relation Age of Onset    Allergies Father            Current Outpatient Medications   Medication Sig Dispense Refill    fluticasone (FLONASE) 50 MCG/ACT nasal spray Spray 1-2 sprays into both nostrils daily 16 g 0         Allergies   Allergen Reactions    Amoxicillin          No lab results found.         BP Readings from Last 3 Encounters:   12/27/23 (!) 116/90   01/31/22 104/86   01/03/22 128/80    Wt Readings from Last 3 Encounters:   12/27/23 131.2 kg (289 lb 4.8 oz)   01/31/22 140.6 kg (310 lb)   01/03/22 143.8 kg (317 lb)                     Review of Systems   Constitutional, HEENT, cardiovascular, pulmonary, gi and gu systems are negative, except as otherwise noted.          Objective    BP (!) 116/90   Pulse 105   Temp 98.4  F (36.9  C) (Tympanic)   Resp 18   Ht 1.829 m (6')   Wt 131.2 kg (289 lb 4.8 oz)   SpO2  98%   BMI 39.24 kg/m    Body mass index is 39.24 kg/m .        Physical Exam   GENERAL: healthy, alert and no distress  NECK: no adenopathy, no asymmetry, masses, or scars and thyroid normal to palpation  RESP: lungs clear to auscultation - no rales, rhonchi or wheezes  CV: regular rate and rhythm, normal S1 S2, no S3 or S4, no murmur, click or rub, no peripheral edema and peripheral pulses strong  ABDOMEN: soft, nontender, no hepatosplenomegaly, no masses and bowel sounds normal  PSYCH: mentation appears normal, affect normal/bright

## 2023-12-27 ENCOUNTER — OFFICE VISIT (OUTPATIENT)
Dept: FAMILY MEDICINE | Facility: OTHER | Age: 28
End: 2023-12-27
Attending: NURSE PRACTITIONER
Payer: MEDICARE

## 2023-12-27 VITALS
DIASTOLIC BLOOD PRESSURE: 90 MMHG | SYSTOLIC BLOOD PRESSURE: 116 MMHG | HEART RATE: 105 BPM | RESPIRATION RATE: 18 BRPM | TEMPERATURE: 98.4 F | WEIGHT: 289.3 LBS | HEIGHT: 72 IN | OXYGEN SATURATION: 98 % | BODY MASS INDEX: 39.18 KG/M2

## 2023-12-27 DIAGNOSIS — A08.4 VIRAL GASTROENTERITIS: Primary | ICD-10-CM

## 2023-12-27 PROCEDURE — G0463 HOSPITAL OUTPT CLINIC VISIT: HCPCS

## 2023-12-27 PROCEDURE — 99212 OFFICE O/P EST SF 10 MIN: CPT | Performed by: NURSE PRACTITIONER

## 2023-12-27 ASSESSMENT — PAIN SCALES - GENERAL: PAINLEVEL: NO PAIN (0)

## 2023-12-27 NOTE — LETTER
December 27, 2023      Waldemar Nicole  [address] [place] [id number]        To Whom It May Concern,       Please excuse from work 12/19/23 - 12/27/23.  May RTW 12/28/23.           Sincerely,        Martha Byrne, CNP

## 2023-12-27 NOTE — PATIENT INSTRUCTIONS
Assessment & Plan       Viral gastroenteritis  - Follow-up as needed  - RTW note given        Martha Byrne, CNP  [facility] - [place] [facility]

## 2024-02-18 ENCOUNTER — HEALTH MAINTENANCE LETTER (OUTPATIENT)
Age: 29
End: 2024-02-18

## 2025-03-09 ENCOUNTER — HEALTH MAINTENANCE LETTER (OUTPATIENT)
Age: 30
End: 2025-03-09

## 2025-07-31 ENCOUNTER — TELEPHONE (OUTPATIENT)
Dept: FAMILY MEDICINE | Facility: OTHER | Age: 30
End: 2025-07-31

## 2025-07-31 NOTE — TELEPHONE ENCOUNTER
Call returned to patient, pre-op appointment scheduled with Moon Parra    Next 5 appointments (look out 90 days)      Aug 01, 2025 2:30 PM  (Arrive by 2:15 PM)  Pre-Operative Physical with Moon Parra, CNP  [facility] ([facility] ) [address]  [place] [address]  [phone]          Patient reports his PCP is Dr. Payne, was ok with scheduling with another provider due to short notice for surgery on 8/5/25.

## 2025-07-31 NOTE — TELEPHONE ENCOUNTER
Voicemail message received from patient with Surgery information to include:     DOS 8/5/25, RT Hand Tendon Repair with Dr. Mckeon at [facility] in [place], Time TBD.    Requesting a return call with pre op appointment.

## 2025-07-31 NOTE — TELEPHONE ENCOUNTER
Call from patient and staff member- 3 way call requesting a Pre-Op Visit with Martha Byrne CNP.      Update received the patient was evaluated at [facility] on 7/26/25 for a hand injury and was told that a referral was being placed to [facility] in [place].     Patient states he is scheduled for surgery on Tuesday, 8/5/25 in the afternoon - [facility]- he thinks it is scheduled at [facility], unsure of the procedure, surgeon or if this is the correct location.      This writer has requested the staff member to reach out to [facility] to inquire on the Surgery Date, Location, Surgeon and Procedure to allow this writer to schedule a pre-op.     This writer is unable to view [facility]'s Care Everywhere at this time as a Patient Authorization is required to request records from [facility] and [facility] partners.     Staff will reach out to [facility] and return a call to this writer with the required information to proceed forward with scheduling the pre op visit.     Patient will need to sign a Patient Authorization at the Pre-Op Visit to allow [facility] Staff to view the required information from the ED Visit.     LOV: 12/27/23- Martha Byrne     Awaiting return call.

## 2025-08-01 ENCOUNTER — OFFICE VISIT (OUTPATIENT)
Dept: FAMILY MEDICINE | Facility: OTHER | Age: 30
End: 2025-08-01
Attending: NURSE PRACTITIONER
Payer: MEDICARE

## 2025-08-01 VITALS
DIASTOLIC BLOOD PRESSURE: 90 MMHG | TEMPERATURE: 98.7 F | OXYGEN SATURATION: 96 % | HEIGHT: 72 IN | HEART RATE: 107 BPM | RESPIRATION RATE: 16 BRPM | BODY MASS INDEX: 37.79 KG/M2 | SYSTOLIC BLOOD PRESSURE: 128 MMHG | WEIGHT: 279 LBS

## 2025-08-01 DIAGNOSIS — F84.0 AUTISM: ICD-10-CM

## 2025-08-01 DIAGNOSIS — Z01.818 PREOPERATIVE EXAMINATION: Primary | ICD-10-CM

## 2025-08-01 DIAGNOSIS — S61.401A EXTENSOR TENDON LACERATION OF RIGHT HAND WITH OPEN WOUND, INITIAL ENCOUNTER: ICD-10-CM

## 2025-08-01 DIAGNOSIS — S66.821A EXTENSOR TENDON LACERATION OF RIGHT HAND WITH OPEN WOUND, INITIAL ENCOUNTER: ICD-10-CM

## 2025-08-01 LAB
ANION GAP SERPL CALCULATED.3IONS-SCNC: 12 MMOL/L (ref 7–15)
BUN SERPL-MCNC: 14.4 MG/DL (ref 6–20)
CALCIUM SERPL-MCNC: 9.6 MG/DL (ref 8.8–10.4)
CHLORIDE SERPL-SCNC: 104 MMOL/L (ref 98–107)
CREAT SERPL-MCNC: 1.03 MG/DL (ref 0.67–1.17)
EGFRCR SERPLBLD CKD-EPI 2021: >90 ML/MIN/1.73M2
ERYTHROCYTE [DISTWIDTH] IN BLOOD BY AUTOMATED COUNT: 12.2 % (ref 10–15)
GLUCOSE SERPL-MCNC: 94 MG/DL (ref 70–99)
HCO3 SERPL-SCNC: 26 MMOL/L (ref 22–29)
HCT VFR BLD AUTO: 50.7 % (ref 40–53)
HGB BLD-MCNC: 17.7 G/DL (ref 13.3–17.7)
MCH RBC QN AUTO: 29.5 PG (ref 26.5–33)
MCHC RBC AUTO-ENTMCNC: 34.9 G/DL (ref 31.5–36.5)
MCV RBC AUTO: 84 FL (ref 78–100)
PLATELET # BLD AUTO: 197 10E3/UL (ref 150–450)
POTASSIUM SERPL-SCNC: 3.9 MMOL/L (ref 3.4–5.3)
RBC # BLD AUTO: 6.01 10E6/UL (ref 4.4–5.9)
SODIUM SERPL-SCNC: 142 MMOL/L (ref 135–145)
WBC # BLD AUTO: 7 10E3/UL (ref 4–11)

## 2025-08-01 PROCEDURE — 85014 HEMATOCRIT: CPT | Mod: ZL | Performed by: NURSE PRACTITIONER

## 2025-08-01 PROCEDURE — 80048 BASIC METABOLIC PNL TOTAL CA: CPT | Mod: ZL | Performed by: NURSE PRACTITIONER

## 2025-08-01 PROCEDURE — 36415 COLL VENOUS BLD VENIPUNCTURE: CPT | Mod: ZL | Performed by: NURSE PRACTITIONER

## 2025-08-01 PROCEDURE — G0463 HOSPITAL OUTPT CLINIC VISIT: HCPCS

## 2025-08-01 ASSESSMENT — PAIN SCALES - GENERAL: PAINLEVEL_OUTOF10: MILD PAIN (1)
